# Patient Record
Sex: FEMALE | Race: WHITE | URBAN - METROPOLITAN AREA
[De-identification: names, ages, dates, MRNs, and addresses within clinical notes are randomized per-mention and may not be internally consistent; named-entity substitution may affect disease eponyms.]

---

## 2017-02-21 ENCOUNTER — HISTORICAL (OUTPATIENT)
Dept: LAB | Facility: HOSPITAL | Age: 32
End: 2017-02-21

## 2018-02-23 ENCOUNTER — HISTORICAL (OUTPATIENT)
Dept: ADMINISTRATIVE | Facility: HOSPITAL | Age: 33
End: 2018-02-23

## 2018-02-25 LAB
FINAL CULTURE: NO GROWTH
FINAL CULTURE: NO GROWTH

## 2021-04-22 LAB
ABS NEUT (OLG): 24.19 X10(3)/MCL (ref 2.1–9.2)
ALBUMIN SERPL-MCNC: 2.9 GM/DL (ref 3.5–5)
ALBUMIN/GLOB SERPL: 0.6 RATIO (ref 1.1–2)
ALP SERPL-CCNC: 363 UNIT/L (ref 40–150)
ALT SERPL-CCNC: 250 UNIT/L (ref 0–55)
AMPHET UR QL SCN: NEGATIVE
ANISOCYTOSIS BLD QL SMEAR: 2
APPEARANCE, UA: CLEAR
APTT PPP: 22.5 SECOND(S) (ref 23.2–33.7)
AST SERPL-CCNC: 290 UNIT/L (ref 5–34)
B-HCG SERPL QL: NEGATIVE
BACTERIA SPEC CULT: ABNORMAL /HPF
BARBITURATE SCN PRESENT UR: NEGATIVE
BENZODIAZ UR QL SCN: NEGATIVE
BILIRUB SERPL-MCNC: 3.7 MG/DL
BILIRUB UR QL STRIP: NEGATIVE
BILIRUBIN DIRECT+TOT PNL SERPL-MCNC: 1.5 MG/DL (ref 0–0.8)
BILIRUBIN DIRECT+TOT PNL SERPL-MCNC: 2.2 MG/DL (ref 0–0.5)
BNP BLD-MCNC: 50.2 PG/ML
BUN SERPL-MCNC: 19.8 MG/DL (ref 7–18.7)
CALCIUM SERPL-MCNC: 8.3 MG/DL (ref 8.4–10.2)
CANNABINOIDS UR QL SCN: POSITIVE
CHLORIDE SERPL-SCNC: 96 MMOL/L (ref 98–107)
CK MB SERPL-MCNC: 0.6 NG/ML
CK SERPL-CCNC: 37 U/L (ref 29–168)
CO2 SERPL-SCNC: 22 MMOL/L (ref 22–29)
COCAINE UR QL SCN: NEGATIVE
COLOR UR: YELLOW
CREAT SERPL-MCNC: 0.78 MG/DL (ref 0.55–1.02)
ERYTHROCYTE [DISTWIDTH] IN BLOOD BY AUTOMATED COUNT: 12.6 % (ref 11.5–17)
ETHANOL SERPL-MCNC: <10 MG/DL
GLOBULIN SER-MCNC: 4.5 GM/DL (ref 2.4–3.5)
GLUCOSE (UA): NEGATIVE
GLUCOSE SERPL-MCNC: 182 MG/DL (ref 74–100)
HCT VFR BLD AUTO: 29.4 % (ref 37–47)
HCT VFR BLD AUTO: 37.1 % (ref 37–47)
HGB BLD-MCNC: 10.2 GM/DL (ref 12–16)
HGB BLD-MCNC: 13 GM/DL (ref 12–16)
HGB UR QL STRIP: ABNORMAL
INR PPP: 1.1 (ref 0–1.3)
KETONES UR QL STRIP: ABNORMAL
LACTATE SERPL-SCNC: 1.2 MMOL/L (ref 0.5–2.2)
LEUKOCYTE ESTERASE UR QL STRIP: ABNORMAL
LIPASE SERPL-CCNC: 67 U/L
LYMPHOCYTES NFR BLD MANUAL: 6 % (ref 13–40)
MACROCYTES BLD QL SMEAR: 2 /MCL
MCH RBC QN AUTO: 36.1 PG (ref 27–31)
MCHC RBC AUTO-ENTMCNC: 35 GM/DL (ref 33–36)
MCV RBC AUTO: 103.1 FL (ref 80–94)
METAMYELOCYTES NFR BLD MANUAL: 1 %
MONOCYTES NFR BLD MANUAL: 1 % (ref 2–11)
MYELOCYTES NFR BLD MANUAL: 2 %
NEUTROPHILS NFR BLD MANUAL: 90 % (ref 47–80)
NITRITE UR QL STRIP: NEGATIVE
OPIATES UR QL SCN: NEGATIVE
PCP UR QL: NEGATIVE
PH UR STRIP.AUTO: 6.5 [PH] (ref 5–7.5)
PH UR STRIP: 6.5 [PH] (ref 5–9)
PLATELET # BLD AUTO: 296 X10(3)/MCL (ref 130–400)
PLATELET # BLD EST: NORMAL 10*3/UL
PMV BLD AUTO: 11.8 FL (ref 9.4–12.4)
POIKILOCYTOSIS BLD QL SMEAR: 1
POTASSIUM SERPL-SCNC: 4 MMOL/L (ref 3.5–5.1)
PROT SERPL-MCNC: 7.4 GM/DL (ref 6.4–8.3)
PROT UR QL STRIP: ABNORMAL
PROTHROMBIN TIME: 13.5 SECOND(S) (ref 11.1–13.7)
RBC # BLD AUTO: 3.6 X10(6)/MCL (ref 4.2–5.4)
RBC #/AREA URNS HPF: ABNORMAL /HPF
SARS-COV-2 RNA RESP QL NAA+PROBE: NOT DETECTED
SODIUM SERPL-SCNC: 134 MMOL/L (ref 136–145)
SP GR FLD REFRACTOMETRY: 1.01 (ref 1–1.03)
SP GR UR STRIP: 1.01 (ref 1–1.03)
SQUAMOUS EPITHELIAL, UA: ABNORMAL /HPF
TROPONIN I SERPL-MCNC: 0.05 NG/ML (ref 0–0.04)
TROPONIN I SERPL-MCNC: 0.08 NG/ML (ref 0–0.04)
TROPONIN I SERPL-MCNC: 0.09 NG/ML (ref 0–0.04)
UROBILINOGEN UR STRIP-ACNC: 1
WBC # SPEC AUTO: 29.4 X10(3)/MCL (ref 4.5–11.5)
WBC #/AREA URNS HPF: 30 /HPF (ref 0–3)

## 2021-04-23 ENCOUNTER — HISTORICAL (OUTPATIENT)
Dept: ADMINISTRATIVE | Facility: HOSPITAL | Age: 36
End: 2021-04-23

## 2021-04-23 LAB
ABS NEUT (OLG): 14.19 X10(3)/MCL (ref 2.1–9.2)
ALBUMIN SERPL-MCNC: 2 GM/DL (ref 3.5–5)
ALBUMIN/GLOB SERPL: 0.6 RATIO (ref 1.1–2)
ALP SERPL-CCNC: 205 UNIT/L (ref 40–150)
ALT SERPL-CCNC: 116 UNIT/L (ref 0–55)
ANISOCYTOSIS BLD QL SMEAR: 2
ANTINUCLEAR ANTIBODY SCREEN (OHS): NEGATIVE
AST SERPL-CCNC: 96 UNIT/L (ref 5–34)
BILIRUB SERPL-MCNC: 2.4 MG/DL
BILIRUBIN DIRECT+TOT PNL SERPL-MCNC: 0.9 MG/DL (ref 0–0.8)
BILIRUBIN DIRECT+TOT PNL SERPL-MCNC: 1.5 MG/DL (ref 0–0.5)
BUN SERPL-MCNC: 14.2 MG/DL (ref 7–18.7)
CALCIUM SERPL-MCNC: 7.5 MG/DL (ref 8.4–10.2)
CHLORIDE SERPL-SCNC: 106 MMOL/L (ref 98–107)
CK MB SERPL-MCNC: 0.4 NG/ML
CK MB SERPL-MCNC: <0.3 NG/ML
CK SERPL-CCNC: 16 U/L (ref 29–168)
CK SERPL-CCNC: 17 U/L (ref 29–168)
CO2 SERPL-SCNC: 23 MMOL/L (ref 22–29)
CREAT SERPL-MCNC: 0.77 MG/DL (ref 0.55–1.02)
DSDNA ANTIBODY (OHS): NEGATIVE
ERYTHROCYTE [DISTWIDTH] IN BLOOD BY AUTOMATED COUNT: 12.6 % (ref 11.5–17)
FERRITIN SERPL-MCNC: 2665.5 NG/ML (ref 4.63–204)
FOLATE SERPL-MCNC: 9.7 NG/ML (ref 7–31.4)
GLOBULIN SER-MCNC: 3.3 GM/DL (ref 2.4–3.5)
GLUCOSE SERPL-MCNC: 113 MG/DL (ref 74–100)
HCT VFR BLD AUTO: 27 % (ref 37–47)
HGB BLD-MCNC: 9.1 GM/DL (ref 12–16)
IRON SATN MFR SERPL: 20 % (ref 20–50)
IRON SERPL-MCNC: 35 UG/DL (ref 50–170)
LYMPHOCYTES NFR BLD MANUAL: 7 % (ref 13–40)
MACROCYTES BLD QL SMEAR: 2 /MCL
MCH RBC QN AUTO: 35.4 PG (ref 27–31)
MCHC RBC AUTO-ENTMCNC: 33.7 GM/DL (ref 33–36)
MCV RBC AUTO: 105.1 FL (ref 80–94)
MONOCYTES NFR BLD MANUAL: 4 % (ref 2–11)
NEUTROPHILS NFR BLD MANUAL: 89 % (ref 47–80)
PLATELET # BLD AUTO: 263 X10(3)/MCL (ref 130–400)
PLATELET # BLD EST: NORMAL 10*3/UL
PMV BLD AUTO: 10.8 FL (ref 7.4–10.4)
POTASSIUM SERPL-SCNC: 3.2 MMOL/L (ref 3.5–5.1)
PROT SERPL-MCNC: 5.3 GM/DL (ref 6.4–8.3)
RBC # BLD AUTO: 2.57 X10(6)/MCL (ref 4.2–5.4)
SODIUM SERPL-SCNC: 138 MMOL/L (ref 136–145)
TIBC SERPL-MCNC: 137 UG/DL (ref 70–310)
TIBC SERPL-MCNC: 172 UG/DL (ref 250–450)
TRANSFERRIN SERPL-MCNC: 144 MG/DL (ref 180–382)
TROPONIN I SERPL-MCNC: 0.07 NG/ML (ref 0–0.04)
TROPONIN I SERPL-MCNC: 0.07 NG/ML (ref 0–0.04)
VIT B12 SERPL-MCNC: >2000 PG/ML (ref 213–816)
WBC # SPEC AUTO: 18.7 X10(3)/MCL (ref 4.5–11.5)

## 2021-04-24 LAB
ALBUMIN SERPL-MCNC: 2.2 GM/DL (ref 3.5–5)
ALBUMIN SERPL-MCNC: 2.4 GM/DL (ref 3.5–5)
ALBUMIN/GLOB SERPL: 0.6 RATIO (ref 1.1–2)
ALBUMIN/GLOB SERPL: 0.8 RATIO (ref 1.1–2)
ALP SERPL-CCNC: 195 UNIT/L (ref 40–150)
ALP SERPL-CCNC: 219 UNIT/L (ref 40–150)
ALT SERPL-CCNC: 104 UNIT/L (ref 0–55)
ALT SERPL-CCNC: 122 UNIT/L (ref 0–55)
AST SERPL-CCNC: 127 UNIT/L (ref 5–34)
AST SERPL-CCNC: 75 UNIT/L (ref 5–34)
BILIRUB SERPL-MCNC: 2.4 MG/DL
BILIRUB SERPL-MCNC: 2.6 MG/DL
BILIRUBIN DIRECT+TOT PNL SERPL-MCNC: 0.5 MG/DL (ref 0–0.8)
BILIRUBIN DIRECT+TOT PNL SERPL-MCNC: 0.9 MG/DL (ref 0–0.8)
BILIRUBIN DIRECT+TOT PNL SERPL-MCNC: 1.7 MG/DL (ref 0–0.5)
BILIRUBIN DIRECT+TOT PNL SERPL-MCNC: 1.9 MG/DL (ref 0–0.5)
BUN SERPL-MCNC: 10.2 MG/DL (ref 7–18.7)
BUN SERPL-MCNC: 9.8 MG/DL (ref 7–18.7)
CALCIUM SERPL-MCNC: 7.6 MG/DL (ref 8.4–10.2)
CALCIUM SERPL-MCNC: 8 MG/DL (ref 8.4–10.2)
CHLORIDE SERPL-SCNC: 106 MMOL/L (ref 98–107)
CHLORIDE SERPL-SCNC: 107 MMOL/L (ref 98–107)
CO2 SERPL-SCNC: 22 MMOL/L (ref 22–29)
CO2 SERPL-SCNC: 25 MMOL/L (ref 22–29)
CREAT SERPL-MCNC: 0.7 MG/DL (ref 0.55–1.02)
CREAT SERPL-MCNC: 0.77 MG/DL (ref 0.55–1.02)
ERYTHROCYTE [DISTWIDTH] IN BLOOD BY AUTOMATED COUNT: 12.4 % (ref 11.5–17)
GLOBULIN SER-MCNC: 3 GM/DL (ref 2.4–3.5)
GLOBULIN SER-MCNC: 3.6 GM/DL (ref 2.4–3.5)
GLUCOSE SERPL-MCNC: 90 MG/DL (ref 74–100)
GLUCOSE SERPL-MCNC: 98 MG/DL (ref 74–100)
HAV IGM SERPL QL IA: NONREACTIVE
HBV CORE IGM SERPL QL IA: NONREACTIVE
HBV SURFACE AG SERPL QL IA: NONREACTIVE
HCT VFR BLD AUTO: 27.9 % (ref 37–47)
HCV AB SERPL QL IA: NONREACTIVE
HEPATITIS PANEL INTERP: NORMAL
HGB BLD-MCNC: 9.1 GM/DL (ref 12–16)
HIV 1+2 AB+HIV1 P24 AG SERPL QL IA: NONREACTIVE
MAGNESIUM SERPL-MCNC: 1.5 MG/DL (ref 1.6–2.6)
MCH RBC QN AUTO: 35.5 PG (ref 27–31)
MCHC RBC AUTO-ENTMCNC: 32.6 GM/DL (ref 33–36)
MCV RBC AUTO: 109 FL (ref 80–94)
PHOSPHATE SERPL-MCNC: 3.2 MG/DL (ref 2.3–4.7)
PLATELET # BLD AUTO: 316 X10(3)/MCL (ref 130–400)
PMV BLD AUTO: 11 FL (ref 9.4–12.4)
POTASSIUM SERPL-SCNC: 3.1 MMOL/L (ref 3.5–5.1)
POTASSIUM SERPL-SCNC: 4 MMOL/L (ref 3.5–5.1)
PROT SERPL-MCNC: 5.4 GM/DL (ref 6.4–8.3)
PROT SERPL-MCNC: 5.8 GM/DL (ref 6.4–8.3)
RBC # BLD AUTO: 2.56 X10(6)/MCL (ref 4.2–5.4)
SODIUM SERPL-SCNC: 138 MMOL/L (ref 136–145)
SODIUM SERPL-SCNC: 142 MMOL/L (ref 136–145)
TROP %DIFF IP 3 (OHS): ABNORMAL %
TROP 3HR (OHS): 0.08 NG/ML (ref 0–0.04)
TROP IP BASE (OHS): ABNORMAL NG/ML (ref 0–0.04)
WBC # SPEC AUTO: 17.3 X10(3)/MCL (ref 4.5–11.5)

## 2021-04-27 LAB
FINAL CULTURE: NORMAL
FINAL CULTURE: NORMAL

## 2021-05-03 ENCOUNTER — HISTORICAL (OUTPATIENT)
Dept: ADMINISTRATIVE | Facility: HOSPITAL | Age: 36
End: 2021-05-03

## 2021-05-03 LAB
ABS NEUT (OLG): 5.4 X10(3)/MCL (ref 2.1–9.2)
ALBUMIN SERPL-MCNC: 3.8 GM/DL (ref 3.4–5)
ALBUMIN/GLOB SERPL: 1.46 {RATIO} (ref 1.5–2.5)
ALP SERPL-CCNC: 67 UNIT/L (ref 38–126)
ALT SERPL-CCNC: 32 UNIT/L (ref 7–52)
APPEARANCE, UA: NORMAL
AST SERPL-CCNC: 36 UNIT/L (ref 15–37)
B-HCG SERPL QL: NEGATIVE
BACTERIA #/AREA URNS AUTO: NORMAL /HPF
BILIRUB SERPL-MCNC: 1.1 MG/DL (ref 0.2–1)
BILIRUB UR QL STRIP: NEGATIVE MG/DL
BILIRUBIN DIRECT+TOT PNL SERPL-MCNC: 0.4 MG/DL (ref 0–0.5)
BILIRUBIN DIRECT+TOT PNL SERPL-MCNC: 0.7 MG/DL
BUN SERPL-MCNC: 11 MG/DL (ref 7–18)
CALCIUM SERPL-MCNC: 8.5 MG/DL (ref 8.5–10)
CHLORIDE SERPL-SCNC: 103 MMOL/L (ref 98–107)
CHOLEST SERPL-MCNC: 216 MG/DL (ref 0–200)
CHOLEST/HDLC SERPL: 5.5 {RATIO}
CO2 SERPL-SCNC: 26 MMOL/L (ref 21–32)
COLOR UR: YELLOW
CREAT SERPL-MCNC: 0.76 MG/DL (ref 0.6–1.3)
ERYTHROCYTE [DISTWIDTH] IN BLOOD BY AUTOMATED COUNT: 11.7 % (ref 11.5–17)
EST CREAT CLEARANCE SER (OHS): 114.17 ML/MIN
GLOBULIN SER-MCNC: 2.6 GM/DL (ref 1.2–3)
GLUCOSE (UA): NEGATIVE MG/DL
GLUCOSE SERPL-MCNC: 97 MG/DL (ref 74–106)
HCT VFR BLD AUTO: 29.9 % (ref 37–47)
HDLC SERPL-MCNC: 39 MG/DL (ref 35–60)
HGB BLD-MCNC: 9.8 GM/DL (ref 12–16)
HGB UR QL STRIP: NEGATIVE UNIT/L
KETONES UR QL STRIP: NEGATIVE MG/DL
LDLC SERPL CALC-MCNC: 170 MG/DL (ref 0–129)
LEUKOCYTE ESTERASE UR QL STRIP: NORMAL UNIT/L
LYMPHOCYTES # BLD AUTO: 2.5 X10(3)/MCL (ref 0.6–3.4)
LYMPHOCYTES NFR BLD AUTO: 29.4 % (ref 13–40)
MCH RBC QN AUTO: 35.6 PG (ref 27–31.2)
MCHC RBC AUTO-ENTMCNC: 33 GM/DL (ref 32–36)
MCV RBC AUTO: 109 FL (ref 80–94)
MONOCYTES # BLD AUTO: 0.5 X10(3)/MCL (ref 0.1–1.3)
MONOCYTES NFR BLD AUTO: 5.8 % (ref 0.1–24)
NEUTROPHILS NFR BLD AUTO: 64.8 % (ref 47–80)
NITRITE UR QL STRIP.AUTO: NEGATIVE
PH UR STRIP: 6 [PH]
PLATELET # BLD AUTO: 610 X10(3)/MCL (ref 130–400)
PMV BLD AUTO: 8 FL (ref 9.4–12.4)
POTASSIUM SERPL-SCNC: 4.8 MMOL/L (ref 3.5–5.1)
PROT SERPL-MCNC: 6.4 GM/DL (ref 6.4–8.2)
PROT UR QL STRIP: NEGATIVE MG/DL
RBC # BLD AUTO: 2.75 X10(6)/MCL (ref 4.2–5.4)
RBC #/AREA URNS HPF: NORMAL /HPF
SODIUM SERPL-SCNC: 137 MMOL/L (ref 136–145)
SP GR UR STRIP: <1.005
SQUAMOUS EPITHELIAL, UA: NORMAL /LPF
TRIGL SERPL-MCNC: 108 MG/DL (ref 30–150)
TSH SERPL-ACNC: 3.45 MIU/ML (ref 0.35–4.94)
UROBILINOGEN UR STRIP-ACNC: 0.2 MG/DL
VLDLC SERPL CALC-MCNC: 21.6 MG/DL
WBC # SPEC AUTO: 8.4 X10(3)/MCL (ref 4.5–11.5)
WBC #/AREA URNS AUTO: NORMAL /[HPF]

## 2021-05-13 ENCOUNTER — HISTORICAL (OUTPATIENT)
Dept: ADMINISTRATIVE | Facility: HOSPITAL | Age: 36
End: 2021-05-13

## 2021-05-13 LAB — BNP BLD-MCNC: 102.4 PG/ML

## 2022-04-07 ENCOUNTER — HISTORICAL (OUTPATIENT)
Dept: ADMINISTRATIVE | Facility: HOSPITAL | Age: 37
End: 2022-04-07

## 2022-04-23 VITALS
DIASTOLIC BLOOD PRESSURE: 70 MMHG | HEIGHT: 66 IN | BODY MASS INDEX: 28.03 KG/M2 | SYSTOLIC BLOOD PRESSURE: 120 MMHG | WEIGHT: 174.38 LBS

## 2022-04-30 NOTE — OP NOTE
DATE OF SURGERY:    04/23/2021    SURGEON:  Huey Henry MD    PROCEDURE:  Esophagogastroduodenoscopy and biopsy.    PREOPERATIVE DIAGNOSIS:  Hematemesis.    POSTOPERATIVE DIAGNOSES:    1. Adequate sedation with Diprivan per Anesthesia.  2. Relatively normal hypopharynx and larynx.  3. Normal esophagoscopy.  4. Normal cardia and fundus.  5. Very sizable, benign-appearing ulcer in the distal body along the greater curvature, with some dark eschar, but without a visible vessel or adjacent blood.  Adjacent biopsies obtained to rule out Helicobacter.  No biopsies of the ulcer itself undertaken.  6. Patent pylorus, with normal duodenum to the second portion.    PROCEDURE IN DETAIL:  The patient consented for the procedure after a detailed explanation of the risks and complications, including bleeding and perforation, as well as adverse reaction to sedation.     The patient was placed in the left lateral decubitus position.  A video endoscope was inserted in the oropharynx and passed under direct vision.  The hypopharynx and larynx were briefly examined and the esophagus entered.  After evaluation of the upper, mid and lower esophagus, the scope was passed into the stomach and retroflexed.  The cardia and fundus were evaluated.  The scope was de-retroflexed and we evaluated the body and antrum.     We traversed the pylorus, evaluating the first and second portions of the duodenum.  The scope was withdrawn and biopsies and specimens obtained as outlined below.  The scope was removed in its entirety and the patient tolerated the procedure well.     The patient's findings are as detailed.  She had quite a sizable, benign-appearing ulcer in the distal body along the greater curvature.  There was a dark eschar, but no visible vessel and no adjacent blood.  Suspecting it to be benign, I did not biopsy it, but I did obtain adjacent biopsies to rule out Helicobacter.  I have to presume this relates to her  nonsteroidal use.    DISCUSSION:  After our procedure was completed, we discussed our findings with the patient.  She needs to be on b.i.d. proton pump inhibition for the time being and then daily PPI therapy for at least an additional 12 weeks.  She needs to avoid aspirin and nonsteroidals in any way, shape, or form, and, of course, she needs to abstain from alcohol.     I would envision she ought to have a repeat endoscopy in 8 weeks to 12 weeks to assure ulcer healing.  I believe this to be benign, but given its size, I am a bit leery about not following up.        ______________________________  MD LISE Crews/VLADISLAV  DD:  04/23/2021  Time:  01:46PM  DT:  04/23/2021  Time:  02:48PM  Job #:  [id number]    cc: __________

## 2022-04-30 NOTE — ED PROVIDER NOTES
Patient:   Judy CALDERON            MRN: [id number]            FIN: [id number]               Age:   35 years     Sex:  Female     :  1985   Associated Diagnoses:   Alcohol withdrawal; GI bleeding   Author:   Chayito JAIN, Wei BHARDWAJ      Basic Information   Time seen: Date & time 2021 09:10:00.   History source: Patient.   Arrival mode: Private vehicle.   History limitation: None.   Additional information: Chief Complaint from Nursing Triage Note : Chief Complaint   2021 9:08 CDT       Chief Complaint           pt c/o bilateral pedal and ankle edmea, hypertension, and reports vomiting blood this morning, reports being an alcoholic  .      History of Present Illness   The patient presents with   35 year old female with hx of alcoholism presents to ED for elevated blood pressure for the past few days with bilateral lower extremity swelling; Reports hematemesis this AM; Reports previous history of HTN, but reports weight loss that normalized pressure; denies headache, dizziness; Last drink last night - M. GABY Lea  and     I, Dr. Stratton, assumed care of this patient upon walking into the room at 0956.     34 y/o  female with a history of HTN presents to the ED with complaints of nausea, generalized weakness, and hives following a recent post nasal drip. The pt states she had a visit at the clinic Tuesday where she was prescribed Zofran, medicine for coughing, and steroid cream. After her visit at the clinic the patient gagged herself this morning, experiencing hematemesis with abdominal pain. The pt says she is having darker than normal stools, and has been on steroids before with no episodes similar to this. The pt states last menstrual cycle is unknown. The pt states she has at least half a fifth of liquor everyday. .  The onset was 3  days ago.  The course/duration of symptoms is constant.  Risk factors consist of hypertension and ETOH usage.  Prior episodes: none.   Therapy today: none.  Associated symptoms: abdominal pain, nausea and vomiting.        Review of Systems   Constitutional symptoms:  WeaknessNo fatigue   Skin symptoms:  Negative except as documented in HPI, Hives   Eye symptoms:  Negative except as documented in \[facility] [place] [facility]\""   ENMT symptoms:  Negative except as documented in HPI   Respiratory symptoms:  Negative except as documented in HPI   Cardiovascular symptoms:  Negative except as documented in HPI   Gastrointestinal symptoms:  Change in stool color (Darker than normal stools), Acid reflux   Musculoskeletal symptoms:  Negative except as documented in HPI.   Neurologic symptoms:  Negative except as documented in HPI   Hematologic/Lymphatic symptoms:  Negative except as documented in \[facility] [place] [facility]\""             Additional review of systems information: All other systems reviewed and otherwise negative.      Health Status   Allergies:    Allergic Reactions (Selected)  Severity Not Documented  Lisinopril- Cough..   Medications:  (Selected)   Prescriptions  Prescribed  Flonase 50 mcg/inh nasal spray: 2 spray(s), Nasal, Daily, # 16 gm, 11 Refill(s), Pharmacy: [facility] [id number]  NIFEdipine 30 mg oral tablet, extended release: See Instructions, TAKE 1 TABLET BY MOUTH TWICE DAILY, # 60 tab(s), 11 Refill(s), Pharmacy: [facility]  busPIRone 10 mg oral tablet: 10 mg = 1 tab(s), Oral, TID, # 90 tab(s), 11 Refill(s), Pharmacy: [facility] [id number]  labetalol 200 mg oral tablet: See Instructions, TAKE 1 TABLET BY MOUTH TWICE DAILY, # 60 tab(s), 11 Refill(s), Pharmacy: [facility]  sertraline 50 mg oral tablet: See Instructions, TAKE 1 TABLET BY MOUTH DAILY, # 30 tab(s), 11 Refill(s), eRx: [facility]  Documented Medications  Documented  Misc Prescription: LIVER AND KIDNEY SUPPORT 3 TABS DAILY, 0 Refill(s)  Misc Prescription: POTASSIUM DAILY, 0 Refill(s)  Prenatal Multivitamins: Oral, Daily, 0 Refill(s)  biotin: Oral, Daily, 0  Refill(s)  clonazePAM 0.5 mg oral tablet: 1 mg, 1-2 tab(s), Oral, qPM.      Past Medical/ Family/ Social History   Medical history:    Active  Hypertension ([id number]): Onset in  at 26 years.  Resolved  Pre-eclampsia (2511093343):  Resolved.  Comments:  2019 CST 7:36 CST - Agustín CLEVELAND, Teodora LATOYA  WITH FIRST PREGNANCY.  PLACENTA ABRUPTION AT 34 WEEKS GESTATION..   Surgical history:    RIGHT ACL & MENINSCAL REPAIR in 2014 at 29 Years.  Extraction of wisdom tooth ([id number]) in 2014 at 29 Years.   X1.   of stillborn b/c placenta abruptia..   Family history:    Total hip replacement  Mother  Anxiety  Mother  Diabetes mellitus type 2  Mother  Brother  Osteoporosis  Mother  Detached retina  Mother  Hypercholesterolemia  Father  Brother  CABG x 5 - Coronary artery bypass grafts x 5  Father  Hypertension.  Father  Brother  Depression.  Mother  Sister  .   Social history: Alcohol use: Regularly, Tobacco use: Denies, Occupation: Employed, Family/social situation: .   Problem list:    Active Problems (4)  Anxiety   Hypertension   Immunization due   Wellness examination   .      Physical Examination               Vital Signs   Vital Signs   2021 9:08 CDT       Temperature Oral          36.6 DegC                             Temperature Oral (calculated)             97.88 DegF                             Peripheral Pulse Rate     127 bpm  HI                             Respiratory Rate          22 br/min                             SpO2                      100 %                             Oxygen Therapy            Room air                             Systolic Blood Pressure   187 mmHg  HI                             Diastolic Blood Pressure  129 mmHg  HI  .   Measurements   2021 9:08 CDT       Weight Dosing             68 kg                             Weight Measured and Calculated in Lbs     149.91 lb                             Weight Estimated          68 kg                              Height/Length Dosing      167 cm                             Height/Length Estimated   167 cm                             Body Mass Index Estimated 24.38 kg/m2  .   Basic Oxygen Information   4/22/2021 10:17 CDT      SpO2                      100 %    4/22/2021 9:08 CDT       SpO2                      100 %                             Oxygen Therapy            Room air  .   General:  Alert, mild distress, Tremulous   Neurological:  Alert and oriented to person, place, time, and situation, No focal neurological deficit observed, CN II-XII intact, normal sensory observed, normal motor observed, normal coordination observed, Speech: Normal.    Skin:  Warm, dry   Head:  Normocephalic, atraumatic   Neck:  Supple, trachea midline, no JVD, no carotid bruit   Eye:  Pupils are equal, round and reactive to light, extraocular movements are intact, normal conjunctiva, vision unchanged   Ears, nose, mouth and throat:  Tympanic membranes clear, oral mucosa moist, no pharyngeal erythema or exudate   Cardiovascular:  Regular rate and rhythm, No murmur, Normal peripheral perfusion, Tachycardia   Respiratory:  Lungs are clear to auscultation, breath sounds are equal   Back:  Nontender, Normal range of motion   Musculoskeletal:  Normal ROM, normal strength, no tenderness, no swelling   Gastrointestinal:  Soft, Nontender, Non distended, Normal bowel sounds   Lymphatics:  No lymphadenopathy.   Psychiatric:  Cooperative, Mood and affect: Anxious.       Medical Decision Making   Documents reviewed:  Emergency department nurses' notes.   Orders  Launch Orders   Laboratory:  POC Urine Pregnancy Test ED (Order): Urine, Stat collect, Collected, 4/22/2021 9:14 CDT by Ricardo Lea PA-C Nurse collect, Print Label By Order Location  Urinalysis with Reflex (Order): Stat collect, Urine, 4/22/2021 9:14 CDT, Nurse collect, Print Label By Order Location  Lipase Level (Order): Stat collect, 4/22/2021 9:14 CDT, Blood, Lab Collect, Print Label By  Order Location, 4/22/2021 9:14 CDT  BNP (Order): Stat collect, 4/22/2021 9:14 CDT, Blood, Lab Collect, Print Label By Order Location, 4/22/2021 9:14 CDT  POC iSTAT ER Troponin request (Order): Blood, Stat collect, 4/22/2021 9:14 CDT by Venu GRIFFIN, Ricardo VELÁSQUEZ Lab Collect, Print Label By Order Location  Troponin-I (Order): Stat collect, 4/22/2021 9:14 CDT, Blood, Lab Collect, Print Label By Order Location, 4/22/2021 9:14 CDT  CBC w/ Auto Diff (Order): Stat collect, 4/22/2021 9:14 CDT, Blood, Lab Collect, Print Label By Order Location, 4/22/2021 9:14 CDT  CMP (Order): Stat collect, 4/22/2021 9:14 CDT, Blood, Lab Collect, Print Label By Order Location, 4/22/2021 9:14 CDT  Radiology:  XR Chest 1 View (Order): Stat, 4/22/2021 9:14 CDT, Cough, None, Stretcher, Rad Type, Not Scheduled  Cardiology:  EKG (Order): 4/22/2021 9:14 CDT, Stat, Once, 4/22/2021 9:14 CDT, -1, -1, 4/22/2021 9:14 CDT, Launch Orders   Laboratory:  PTT (Order): Stat collect, 4/22/2021 9:15 CDT, Blood, Lab Collect, Print Label By Order Location, 4/22/2021 9:15 CDT  PT (Protime) (Order): Stat collect, 4/22/2021 9:15 CDT, Blood, Lab Collect, Print Label By Order Location, 4/22/2021 9:15 CDT, Launch Orders   Pharmacy:  hydrALAZINE (Apresoline) Inj. (Order): 10 mg, form: Injection, IV Push, Once, first dose 4/22/2021 9:15 CDT, stop date 4/22/2021 9:15 CDT, STAT  Zofran 2 mg/mL injectable solution (Order): 4 mg, form: Injection, IV Push, Once, first dose 4/22/2021 9:15 CDT, stop date 4/22/2021 9:15 CDT, STAT.    Electrocardiogram:  Time 4/22/2021 09:59:00, rate 126, The Rhythm is sinus tachycardia.  .    Results review:  Lab results : Lab View   4/22/2021 13:00 CDT      Troponin-I                0.078 ng/mL  HI                             Hgb                       10.2 gm/dL  LOW                             Hct                       29.4 %  LOW    4/22/2021 11:36 CDT      SARS-CoV-2 PCR            Not Detected    4/22/2021 10:45 CDT      U pH                       6.5                             U Spec Grav               1.011                             UA Appear                 CLEAR                             UA Color                  YELLOW                             UA Spec Grav              1.011                             UA Bili                   Negative                             UA pH                     6.5                             UA Urobilinogen           1.0                             UA Blood                  Trace                             UA Glucose                Negative                             UA Ketones                Trace                             UA Protein                Trace                             UA Nitrite                Negative                             UA Leuk Est               2+                             UA WBC                    30 /HPF  HI                             UA RBC                    1-2 /HPF                             UA Bacteria               2+ /HPF                             UA Squam Epithelial       0-4 /HPF                             U Amph Scr                Negative                             U Christie Scr                Negative                             U Benzodia Scr            Negative                             U Cannab Scr              Positive                             U Cocaine Scr             Negative                             U Opiate Scr              Negative                             U Phencyclidine Scr       Negative    4/22/2021 10:32 CDT      Lactic Acid Lvl           1.2 mmol/L                             Ethanol Lvl               <10.0 mg/dL  NA    4/22/2021 10:00 CDT      Est Creat Clearance Ser   93.32 mL/min    4/22/2021 9:21 CDT       Sodium Lvl                134 mmol/L  LOW                             Potassium Lvl             4.0 mmol/L                             Chloride                  96 mmol/L  LOW                             CO2                       22  mmol/L                             Calcium Lvl               8.3 mg/dL  LOW                             Glucose Lvl               182 mg/dL  HI                             BUN                       19.8 mg/dL  HI                             Creatinine                0.78 mg/dL                             eGFR-AA                   >60  NA                             eGFR-AMADOU                  >60 mL/min/1.73 m2  NA                             Bili Total                3.7 mg/dL  HI                             Bili Direct               2.2 mg/dL  HI                             Bili Indirect             1.50 mg/dL  HI                             AST                       290 unit/L  HI                             ALT                       250 unit/L  HI                             Alk Phos                  363 unit/L  HI                             Total Protein             7.4 gm/dL                             Albumin Lvl               2.9 gm/dL  LOW                             Globulin                  4.5 gm/dL  HI                             A/G Ratio                 0.6 ratio  LOW                             BNP                       50.2 pg/mL                             Lipase Lvl                67 U/L  HI                             Troponin-I                0.052 ng/mL  HI                             PT                        13.5 second(s)                             INR                       1.1                             PTT                       22.5 second(s)  LOW                             WBC                       29.4 x10(3)/mcL  HI                             RBC                       3.60 x10(6)/mcL  LOW                             Hgb                       13.0 gm/dL                             Hct                       37.1 %                             Platelet                  296 x10(3)/mcL                             MCV                       103.1 fL  HI                             MCH                        36.1 pg  HI                             MCHC                      35.0 gm/dL                             RDW                       12.6 %                             MPV                       11.8 fL                             Abs Neut                  24.19 x10(3)/mcL  HI                             Neut Man                  90 %  HI                             Lymph Man                 6 %  LOW                             Monocyte Man              1 %  LOW                             [place] Man                  1 %  HI                             Myelo Man                 2 %  HI                             Platelet Est              Normal                             Anisocyte                 2  HI                             Poik                      1  HI                             Macrocyte                 2 /mcL  HI                             Stomatocytes              1+  .   Radiology results:  Rad Results (ST)  < 12 hrs   Accession: [id number]  Order: XR Chest 1 View  Report Dt/Tm: 04/22/2021 10:33  Report:   EXAM: XR Chest 1 View  DATE: 4/22/2021 10:03 AM CDT  INDICATION: Cough     COMPARISON: None available.     TECHNIQUE: AP view of the chest.        FINDINGS:   The cardiomediastinal silhouette is normal in size and contour.  Pulmonary vascularity is within normal limits. The lungs are  well-inflated and are without focal consolidation, pleural effusion,  or pneumothorax.     The imaged osseous structures and soft tissues are without acute  abnormality.        IMPRESSION:  No acute cardiopulmonary process.      .      Reexamination/ Reevaluation   Patient with some relief after Ativan.  Given Protonix and fluids in the ED.  Patient with elevated bilirubin, liver enzymes consistent with her alcohol abuse.  Her hematocrit did drop 8 points, with fairly mild dehydration, so there is a concern for GI bleeding still.  Remains somewhat hypertensive and tachycardic, I believe this is secondary  to alcohol withdrawal but will have to be monitored as well with serial H&H's.  We'll admit      Procedure   Critical care note   Total time: 45 minutes spent engaged in work directly related to patient care and/ or available for direct patient care.   Critical condition(s) addressed for impending deterioration include: cardiovascular.   Associated risk factors: bleeding, hypertension.   Management: bedside assessment, supervision of care, Interpretation (electrocardiogram, blood pressure), Interventions hemodynamic management, Case review primary care physician.   Performed by: self.      Impression and Plan   Diagnosis   Alcohol withdrawal (FIQ01-DW F10.239)   GI bleeding (YIE81-GH K92.2)      Calls-Consults   -  Ronnell.   Plan   Condition: Stable.    Disposition: Admit time  4/22/2021 14:09:00, Admit to Inpatient Unit.    Counseled: Patient, Family, Regarding diagnosis, Regarding diagnostic results, Regarding treatment plan, Patient indicated understanding of instructions.    Notes: I, Gavin Alvarez, acted soley as a scribe for and in the prescence of  who performed the service. , I  personally performed all of the above services as recorded in this chart.

## 2022-04-30 NOTE — CONSULTS
Patient:   Judy CALDERON            MRN: [id number]            FIN: [id number]               Age:   35 years     Sex:  Female     :  1985   Associated Diagnoses:   None   Author:   Carito Jewell PA-C      Basic Information   Source of history:  Self, Spouse, Medical record.    Present at bedside:  Significant other.    History limitation:  None.       Chief Complaint   We have been consulted by Dr. Polk for hematemesis.      History of Present Illness     This is a 34 YO CF, unknown to our group, with a past medical history of etoh abuse, depression, HTN, and placental abruption s/p spontaneous  and DIC. Presented to the ED 21 with complaints of hematemesis, peripheral edema, and HTN. On arrival, hypertensive 187/129 and tachycardic 127 bbm. Labs revealed hyponatremia, hyperglycemia 182, elevated BUN 19.8, hyperbilirubinemia 3.7, direct predominant 2.2, , , , hypoalbuminemia 2.9, elevated troponin thought secondary to demand ischemia, INR 1.1, leukocytosis 29.4, macrocytosis with likely hemoconcentration hemoglobin 13 g/dL, ethanol undetected, toxicology positive for cannabis.  GI consulted for hematemesis.    Reports acute, moderate volume, bright red hematemesis yesterday morning that occurred after triggering her gag reflex by brushing her teeth. Admits blood filled the sink. Denies further episodes since. Reports unintentional weight loss of 45lbs over the past 3 years attributed to etoh abuse. Admits to anorexia. Admits to frequent nausea secondary to post nasal drip. Denies vomiting. Denies history of hematemesis. Admits to infrequent reflux, pyrosis, and dyspepsia controlled with OTC TUMs as needed. Denies abdominal pain. Change in bowel habits described as a more watery consistency again attributed to etoh abuse. Denies melena or hematochezia. Admits that peripheral edema started around Tuesday with generalized hives. Was treated at  with several  different medications. Denies jaundice, choluria, pruritus or scleral icterus. Denies history of anemia; however, admits that she had a placental abruption and subsequent miscarriage 3 years ago which led to DIC and several blood products transfusion. This is the cause for her drinking. Admits she rather drink than take the drugs her psychiatrist provides. Etoh insidious increase over the past 3 years to half a fifth daily. Denies use of tobacco. Takes Naprosyn for generalized aches/pains every other day. Smokes marijuana weekly. Mother with history of the need for esophageal dilations; otherwise, denies familial history of GI neoplasm. Endoscopy naive.     Previous records reviewed. Collateral information obtained from  at bedside.      Review of Systems   Constitutional:  No fever, No chills, No weakness, No fatigue.    Eye:  No icterus.    Ear/Nose/Mouth/Throat:  No sore throat.    Respiratory:  No shortness of breath, No cough.    Cardiovascular:  Peripheral edema, No chest pain, No palpitations.    Gastrointestinal:  Nausea, Hematemesis, No melena, No hematochezia, No vomiting, No diarrhea, No constipation, No heartburn, No abdominal pain.    Genitourinary:  No dysuria, No hematuria.    Musculoskeletal:  No joint pain, No muscle pain.    Integumentary:  No jaundice, No rash.    Neurologic:  No confusion, No headache.    Psychiatric:  Depression, Not suicidal, Not delusional, No hallucinations.       Health Status   Allergies:    Allergies (1) Active Reaction  lisinopril Cough     Current medications:  (Selected)   Inpatient Medications  Ordered  Ativan: 1 mg, form: Injection, IV Push, q4hr PRN for agitation, first dose 04/23/21 7:45:00 CDT, NOW  Phenergan: 12.5 mg, form: Injection, IM, q4hr PRN for nausea/vomiting, first dose 04/22/21 16:22:00 CDT  Protonix 40 mg Vial (IV Push): 40 mg, form: Injection, IV Slow, BID, Infuse over: 2 minute(s), first dose 04/22/21 21:00:00 CDT  Rocephin (for IVPB): 1,000  mg, form: Injection Complicated UTI, IV Piggyback, q24hr, Infuse over: 30 minute(s), first dose 21 19:00:00 CDT  Serax: 10 mg, form: Cap, Oral, TID, first dose 21 18:59:00 CDT  Sodium Chloride 0.9% 1000mL 1,000 mL: 1,000 mL, 1,000 mL, IV, Now, 500 mL/hr, start date 21 10:11:00 CDT, 1.76, m2  Zofran: 4 mg, form: Injection, IV Push, q4hr PRN for nausea, first dose 21 16:22:00 CDT  folic acid 1 mg oral tablet: 1 mg, form: Tab, Oral, Daily, first dose 21 9:00:00 CDT  hydrALAZINE (Apresoline) Inj.: 10 mg, form: Injection, IV Push, q2hr PRN for hypertension, first dose 21 17:21:00 CDT  thiamine: 100 mg, form: Tab, Oral, Daily, first dose 21 9:00:00 CDT  Documented Medications  Documented  Latuda 20 mg oral tablet: 20 mg = 1 tab(s), Oral, Daily  azithromycin 250 mg oral tablet: 250 mg = 1 tab(s), Oral, As Directed  benzonatate 200 mg oral capsule: 200 mg = 1 cap(s), Oral, TID, PRN PRN cough  methylPREDNISolone 4 mg oral tab: Oral  ondansetron 4 mg oral tablet, disintegratin mg = 1 tab(s), Oral, q8hr  triamcinolone 0.5% topical cream: TOP, BID, PRN PRN rash,    Medications (10) Active  Scheduled: (5)  cefTRIAXone  1,000 mg 1 EA, IV Piggyback, q24hr  folic acid 1 mg Tab UD  1 mg 1 tab(s), Oral, Daily  oxazepam 10 mg Cap  10 mg 1 cap(s), Oral, TID  pantoprazole 40 mg Inj  40 mg 1 EA, IV Slow, BID  thiamine 100 mg (Vit B1) Tab UD  100 mg 1 tab(s), Oral, Daily  Continuous: (1)  sodium chloride 0.9% 1,000 mL  1,000 mL, IV, 500 mL/hr  PRN: (4)  hydrALAzine 20 mg/ml Inj- 1 mL  10 mg 0.5 mL, IV Push, q2hr  LORazepam 2 mg/ml Inj  1 mg 0.5 mL, IV Push, q4hr  ondansetron 2 mg/mL inj - 2mL  4 mg 2 mL, IV Push, q4hr  promethazine 25 mg/mL Inj  12.5 mg 0.5 mL, IM, q4hr     Problem list:    All Problems  Anxiety / SNOMED CT 08987393 / Confirmed  Hypertension / SNOMED CT 60265493 / Confirmed  Immunization due / SNOMED CT 996776779 / Confirmed  Wellness examination / SNOMED CT 328509535 /  Confirmed  Resolved: Pre-eclampsia / SNOMED CT 1056468620  WITH FIRST PREGNANCY.  PLACENTA ABRUPTION AT 34 WEEKS GESTATION.      Histories   Past Medical History:    Active  Hypertension (66525068): Onset in  at 26 years.  Resolved  Pre-eclampsia (2376755840):  Resolved.  Comments:  2019 CST 7:36 CST - Teodora Randolph CMA  WITH FIRST PREGNANCY.  PLACENTA ABRUPTION AT 34 WEEKS GESTATION.   Family History:    Total hip replacement  Mother  Anxiety  Mother  Diabetes mellitus type 2  Mother  Brother  Osteoporosis  Mother  Detached retina  Mother  Hypercholesterolemia  Father  Brother  CABG x 5 - Coronary artery bypass grafts x 5  Father  Hypertension.  Father  Brother  Depression.  Mother  Sister     Procedure history:    RIGHT ACL & MENINSCAL REPAIR in 2014 at 29 Years.  Extraction of wisdom tooth ([id number]) in 2014 at 29 Years.   X1.   of stillborn b/c placenta abruptia.   Social History        Social & Psychosocial Habits    Alcohol  2019  Use: Never    2019  Use: Current    Type: Beer, Liquor    Frequency: Daily    Average drinks per episode in last year: 6    Comment: ETOH WEEKLY - 2019 10:07 - Teodora Randolph CMA    Employment/School  2019  Status: Employed    Description: [employer]    Home/Environment  2019  Lives with: Spouse    Comment: 1 SON  AT 34 WEEKS IN UTERO PLACENTA ABRUPTIA. - 2019 07:33 - Teodora Randolph CMA      Comment: 1 SON HEALTHY - 2019 07:33 - Teodora Randolph CMA    Substance Use  2021  Use: Current    Type: Marijuana    Frequency: 1-2 times per week    Tobacco  2019  Use: Never (less than 100 in l    Patient Wants Consult For Cessation Counseling N/A    2019  Use: Never (less than 100 in l    Patient Wants Consult For Cessation Counseling N/A    2021  Use: Never (less than 100 in l    Patient Wants Consult For Cessation Counseling N/A    Abuse/Neglect  2021  SHX Any signs of abuse  or neglect No  .        Physical Examination   General:  Alert and oriented, No acute distress.    Eye:  Normal conjunctiva.    HENT:  Normocephalic, Oral mucosa is moist.    Neck:  Supple.    Respiratory:  Respirations are non-labored, Breath sounds are equal, Symmetrical chest wall expansion.    Cardiovascular:  Normal rate, Regular rhythm.    Gastrointestinal:  Soft, Non-tender, Non-distended, Normal bowel sounds.       Vital Signs (last 24 hrs)_____  Last Charted___________  Temp Oral     36.6 DegC  (APR 23 08:11)  Heart Rate Peripheral   H 112bpm  (APR 23 08:11)  Resp Rate         16 br/min  (APR 23 04:43)  SBP      H 147mmHg  (APR 23 08:11)  DBP      H 99mmHg  (APR 23 08:11)  SpO2      100 %  (APR 23 08:11)  Weight      68 kg  (APR 22 16:44)  Height      167 cm  (APR 22 16:44)  BMI      24.38  (APR 22 16:44)     Musculoskeletal:  Minimal peripheral edema BLEs.    Integumentary:  Warm, Dry.    Neurologic:  Alert, Oriented.    Psychiatric:  Cooperative, Appropriate mood & affect.       Review / Management   Results review   Laboratory Results   Today's Lab Results : PowerNote Discrete Results   4/23/2021 6:09 CDT       Est Creat Clearance Ser   94.53 mL/min    4/23/2021 5:34 CDT       WBC                       18.7 x10(3)/mcL  HI                             RBC                       2.57 x10(6)/mcL  LOW                             Hgb                       9.1 gm/dL  LOW                             Hct                       27.0 %  LOW                             Platelet                  263 x10(3)/mcL                             MCV                       105.1 fL  HI                             MCH                       35.4 pg  HI                             MCHC                      33.7 gm/dL                             RDW                       12.6 %                             MPV                       10.8 fL                             Abs Neut                  14.19 x10(3)/mcL  HI                              Neut Man                  89 %  HI                             Lymph Man                 7 %  LOW                             Monocyte Man              4 %                             Platelet Est              Normal                             Anisocyte                 2  HI                             Macrocyte                 2 /mcL  HI                             Sodium Lvl                138 mmol/L                             Potassium Lvl             3.2 mmol/L  LOW                             Chloride                  106 mmol/L                             CO2                       23 mmol/L                             Calcium Lvl               7.5 mg/dL  LOW                             Glucose Lvl               113 mg/dL  HI                             BUN                       14.2 mg/dL                             Creatinine                0.77 mg/dL                             eGFR-AA                   >60  NA                             eGFR-AMADOU                  >60 mL/min/1.73 m2  NA                             Bili Total                2.4 mg/dL  HI                             Bili Direct               1.5 mg/dL  HI                             Bili Indirect             0.90 mg/dL  HI                             AST                       96 unit/L  HI                             ALT                       116 unit/L  HI                             Alk Phos                  205 unit/L  HI                             Total Protein             5.3 gm/dL  LOW                             Albumin Lvl               2.0 gm/dL  LOW                             Globulin                  3.3 gm/dL                             A/G Ratio                 0.6 ratio  LOW                             Folate Lvl                9.7 ng/mL                             Vitamin B12 Lvl           >2000 pg/mL  HI                             Total CK                  16 U/L  LOW                             CK MB                      <0.3 ng/mL                             Troponin-I                0.074 ng/mL  HI    2021 0:01 CDT       Total CK                  17 U/L  LOW                             CK MB                     0.4 ng/mL                             Troponin-I                0.070 ng/mL  HI    2021 18:11 CDT      Total CK                  37 U/L                             CK MB                     0.6 ng/mL                             Troponin-I                0.085 ng/mL  HI                             Beta hCG Ql               Negative    2021 13:00 CDT      Hgb                       10.2 gm/dL  LOW                             Hct                       29.4 %  LOW                             Troponin-I                0.078 ng/mL  HI        Radiology results   No recent imaging available for review.      Impression and Plan   36 YO CF, unknown to our group, with a past medical history of etoh abuse, depression, HTN, and placental abruption s/p spontaneous  and DIC. Presented to the ED 21 with complaints of hematemesis, peripheral edema, and HTN. GI consulted for hematemesis.    1. Hematemesis  2. Macrocytic anemia  Hgb 13 (hemoconcentrated) --> 10.2 --> 9.1 g/dL  Folic acid wnl  B12 elevated  No overt GI bleeding  - Monitor H&H and transfuse as necessary  - Monitor for overt GI bleeding  - Continue routine medical management, especially withdrawal protocol  - Continue PPI BID  - Make NPO  - Last to eat/drink was clears at 8:30; okay for EGD 2hrs thereafter. Plan for EGD today. Further recommendations to follow.    3. Etoh abuse  4. Hyperbilirubinemia  5. Transaminitis  INR wnl  - F/u abdominal US/hepatitis panel  - Obtain abnormal LFT work-up  - Etoh cessation      Professional Services   Thank you for this consultation and for allowing us to participate in the care of this patient.

## 2022-05-02 NOTE — HISTORICAL [facility] CERNER
This is a historical note converted from Rafa. Formatting and pictures may have been removed.  Please reference Rafa for original formatting and attached multimedia. Chief Complaint  Hematemesis  History of Present Illness  Judy Ernst?is a 35-year-old?woman with a history of anxiety, HTN, and?alcohol abuse?who presents to the ED?with complaints of one episode?of hematemesis?that occurred?this morning?after she?accidentally?triggered her gag reflex?when brushing her teeth?and describes?the blood as bright red?and filling the sink.? She went to walk-in clinic?initially?due to the episode of vomiting?but they advised her to come to the ER for further evaluation.? She admits to drinking a half a bottle of whiskey daily?and the last time she drank was around?2300 last night.??She denies experiencing similar?symptoms in the past. ?She states?that when she was supposed to?go into inpatient rehab for her alcohol abuse?today?but was unable to?due to other circumstances.??he denies?chest pain,?shortness of breath, abdominal pain,?nausea,?current vomiting,?diarrhea, dysuria, or hematuria. ?She mentioned that she?developed some sort of?rash described as hives?on her bilateral lower extremities?that began on 04/19/2021?that?spread to her breasts,?hips, and?upper extremities?the following day?as well as bilateral ankle edema.? She went to?a walk-in clinic?on 04/20/2021?for the rash, ankle swelling?and cough/sinus congestion?and prescribed?oral steroid,?topical steroid,?benzonatate,?and azithromycin.? She said her cough improved and the rash resolved?that same day?however?her ankle swelling?has not improved. Her initial vital signs?showed temperature 97.8 8, heart rate 127, SPO2 100% on room air, and /129. ?Her heart rate has improved and is currently 109 and her blood pressure has improved after medication to 154/99. CXR showed no acute cardiopulmonary process. Her labs showed?sodium 134, glucose 182,  hyperbilirubinemia, transaminitis, lipase 67, troponin 0.052 at 0921 which was repeated again at 1300 and was 0.078, hemoglobin 10.2, and HCT 29.4. ?Urinalysis showed 2+ leuk esterase and 30 WBCs.? Urine drug screen was positive for cannabis.  Review of Systems  As per HPI otherwise all systems reviewed and negative.  Physical Exam  Vitals & Measurements  T:?37.2? ?C (Oral)? TMIN:?36.6? ?C (Oral)? TMAX:?37.2? ?C (Oral)? HR:?109(Peripheral)? RR:?16? BP:?154/99? SpO2:?100%? WT:?68?kg? BMI:?24.38?  General:?well-developed well-nourished in no acute distress, eating while in room, AAOX4, anxious appearing  Eye: PERRLA, EOMI, clear conjunctiva, eyelids normal  HENT:? oropharynx without erythema/exudate, oropharynx and nasal mucosal surfaces moist  Neck: full range of motion,?no lymphadenopathy  Respiratory:?clear to auscultation bilaterally  Cardiovascular:?regular rate and rhythm without murmurs, gallops or rubs,?2+ pitting edema bilaterally  Gastrointestinal:?soft, non-tender, non-distended with normal bowel sounds, without masses to palpation  Genitourinary: no CVA tenderness to palpation  Musculoskeletal:?full range of motion of all extremities/spine without limitation or discomfort  Integumentary: no rashes or skin lesions present  Neurologic: cranial nerves intact, no signs of peripheral neurological deficit, motor/sensory function intact  ?  Assessment/Plan  Possible acute upper?GI bleed, hematemesis  Transaminitis  Hypertensive urgency  Alcohol abuse  Elevated troponin  Anxiety  Leukocytosis?possibly secondary to acute cystitis?or recent steroid use  Hyperglycemia secondary?to recent steroid use  Cannabis use  Peripheral leg edema  ?  ?  -Current H&H stable?continue to monitor?we will repeat CBC in a.m.?,and if Hgb<7 then we will transfuse  -IV Protonix twice daily  -consider GI consult if H&H worsens or additional episodes of hematemesis  -Ultrasound of abdomen?pending  -Repeat?CMP?in a.m.?and trend  LFTs  -Continue blood pressure control as needed  -Trend cardiac enzymes  -monitor for withdrawals, if she begins having signs of withdrawal can initiate serax protocol  -Ativan IV PRN for seizure activity  -Give daily thiamine?100 mg  -B12 and folate level in AM  -New onset?bilateral?peripheral?edema?consider echo,?although chest x-ray?was unremarkable?and lungs clear during exam  -Urine culture,?blood cultures x2 pending  ?  ?  DVT prophylaxis: SCD  Code Status: Full  PCP: None  ?  I, Triston HERRMANNC, discussed this case with?Dr. Polk  Please see addendum for further assessment and plan per MD.  dr polk- For this encounter, i have reviewed the NP/PA documentation,treatment plan,? medical decision making and had face to face time with the patient, as evidence of documentation in one of the following areas: CC/HPI/PE/MDM.  ?   Chart review and patient examined. ?We had to place patient on Serax?10 mg p.o. 3 times daily?secondary to?agitation.? Appears to have an episode of?acute?alcoholic hepatitis.? Taken in consideration that she had an episode of?upper GI bleed?that could be due to Sheron-Foster/PUD?etc. we will go ahead and consult GI..? Will make sure patient is on PPI IV every 12.? Hepatitis profile will follow?as well as abdominal ultrasound.? I agree with assessment and plans done by PA .?Marc. ?We will continue to follow  ?   For impending DTs patient drinks 1/5?of whiskey?daily. ?May need a psych?evaluation of the same time.? Agree with serial H&H?troponins elevated probably no ST elevation MI type II?leak  ?   Problem List/Past Medical History  Ongoing  Anxiety  Hypertension  Procedure/Surgical History  Extraction of wisdom tooth ()  RIGHT ACL & MENINSCAL REPAIR ()   of stillborn b/c placenta abruptia   X1   Medications  Inpatient  hydrALAZINE (Apresoline) Inj., 10 mg= 0.5 mL, IV Push, q2hr, PRN  Phenergan, 12.5 mg= 0.5 mL, IM, q4hr, PRN  Sodium Chloride 0.9% 1000mL 1,000  mL, 1000 mL, IV, Now  Zofran, 4 mg= 2 mL, IV Push, q4hr, PRN  Home  azithromycin 250 mg oral tablet, 250 mg= 1 tab(s), Oral, As Directed  benzonatate 200 mg oral capsule, 200 mg= 1 cap(s), Oral, TID, PRN  Latuda 20 mg oral tablet, 20 mg= 1 tab(s), Oral, Daily  methylPREDNISolone 4 mg oral tab, Oral  ondansetron 4 mg oral tablet, disintegrating, 4 mg= 1 tab(s), Oral, q8hr  triamcinolone 0.5% topical cream, TOP, BID, PRN  Allergies  lisinopril?(Cough)  Social History  Abuse/Neglect  No, 04/22/2021  Alcohol  Current, Beer, Liquor, Daily, 6 drinks/episode average., 03/06/2019  Never, 02/28/2019  Employment/School  Employed, Work/School description: [employer]., 02/28/2019  Home/Environment  Lives with Spouse., 02/28/2019  Substance Use  Current, Marijuana, 1-2 times per week, 04/22/2021  Tobacco  Never (less than 100 in lifetime), N/A, 04/22/2021  Never (less than 100 in lifetime), N/A, 03/06/2019  Never (less than 100 in lifetime), N/A, 02/28/2019  Family History  Anxiety: Mother.  CABG x 5 - Coronary artery bypass grafts x 5: Father.  Depression.: Mother and Sister.  Detached retina: Mother.  Diabetes mellitus type 2: Mother and Brother.  Hypercholesterolemia: Father and Brother.  Hypertension.: Father and Brother.  Osteoporosis: Mother.  Total hip replacement: Mother.  Immunizations  Vaccine Date Status   tetanus/diphth/pertuss (Tdap) adult/adol 08/2010 Recorded   Lab Results  Labs Last 24 Hours?  ?Chemistry? Hematology/Coagulation?   Sodium Lvl:?134 mmol/L?Low (04/22/21 09:21:00) PT: 13.5 second(s) (04/22/21 09:21:00)   Potassium Lvl: 4 mmol/L (04/22/21 09:21:00) INR: 1.1 (04/22/21 09:21:00)   Chloride:?96 mmol/L?Low (04/22/21 09:21:00) PTT:?22.5 second(s)?Low (04/22/21 09:21:00)   CO2: 22 mmol/L (04/22/21 09:21:00) WBC:?29.4 x10(3)/mcL?High (04/22/21 09:21:00)   Calcium Lvl:?8.3 mg/dL?Low (04/22/21 09:21:00) RBC:?3.6 x10(6)/mcL?Low (04/22/21 09:21:00)   Glucose Lvl:?182 mg/dL?High (04/22/21 09:21:00) Hgb:?10.2  gm/dL?Low (04/22/21 13:00:00)   BUN:?19.8 mg/dL?High (04/22/21 09:21:00) Hct:?29.4 %?Low (04/22/21 13:00:00)   Creatinine: 0.78 mg/dL (04/22/21 09:21:00) Platelet: 296 x10(3)/mcL (04/22/21 09:21:00)   Est Creat Clearance Ser: 93.32 mL/min (04/22/21 10:00:47) MCV:?103.1 fL?High (04/22/21 09:21:00)   eGFR-AA: >60 (04/22/21 09:21:00) MCH:?36.1 pg?High (04/22/21 09:21:00)   eGFR-AMADOU: >60 (04/22/21 09:21:00) MCHC: 35 gm/dL (04/22/21 09:21:00)   Bili Total:?3.7 mg/dL?High (04/22/21 09:21:00) RDW: 12.6 % (04/22/21 09:21:00)   Bili Direct:?2.2 mg/dL?High (04/22/21 09:21:00) MPV: 11.8 fL (04/22/21 09:21:00)   Bili Indirect:?1.5 mg/dL?High (04/22/21 09:21:00) Abs Neut:?24.19 x10(3)/mcL?High (04/22/21 09:21:00)   AST:?290 unit/L?High (04/22/21 09:21:00) Neut Man:?90 %?High (04/22/21 09:21:00)   ALT:?250 unit/L?High (04/22/21 09:21:00) Lymph Man:?6 %?Low (04/22/21 09:21:00)   Alk Phos:?363 unit/L?High (04/22/21 09:21:00) Monocyte Man:?1 %?Low (04/22/21 09:21:00)   Total Protein: 7.4 gm/dL (04/22/21 09:21:00) Osgood Man:?1 %?High (04/22/21 09:21:00)   Albumin Lvl:?2.9 gm/dL?Low (04/22/21 09:21:00) Myelo Man:?2 %?High (04/22/21 09:21:00)   Globulin:?4.5 gm/dL?High (04/22/21 09:21:00) Platelet Est: Normal (04/22/21 09:21:00)   A/G Ratio:?0.6 ratio?Low (04/22/21 09:21:00) Anisocyte:?2?High (04/22/21 09:21:00)   BNP: 50.2 pg/mL (04/22/21 09:21:00) Poik:?1?High (04/22/21 09:21:00)   Lactic Acid Lvl: 1.2 mmol/L (04/22/21 10:32:00) Macrocyte:?2 /mcL?High (04/22/21 09:21:00)   Lipase Lvl:?67 U/L?High (04/22/21 09:21:00) Stomatocytes: 1+ (04/22/21 09:21:00)   Troponin-I:?0.078 ng/mL?High (04/22/21 13:00:00)    U pH: 6.5 (04/22/21 10:45:00)    U Spec Grav: 1.011 (04/22/21 10:45:00)    Diagnostic Results  ?  Reason For Exam  Cough  ?  Radiology Report  EXAM: XR Chest 1 View  DATE: 4/22/2021 10:03 AM CDT  INDICATION: Cough  ?  COMPARISON: None available.  ?  TECHNIQUE: AP view of the chest.  ?  ?  FINDINGS:?  The cardiomediastinal silhouette  is normal in size and contour.  Pulmonary vascularity is within normal limits. The lungs are  well-inflated and are without focal consolidation, pleural effusion,  or pneumothorax.  ?  The imaged osseous structures and soft tissues are without acute  abnormality.  ?  ?  IMPRESSION:  No acute cardiopulmonary process.  ?  Signature Line  Electronically Signed By: Augustin Young MD  Date/Time Signed: 04/22/2021 10:33  ?

## 2022-05-02 NOTE — HISTORICAL [facility] CERNER
This is a historical note converted from Rafa. Formatting and pictures may have been removed.  Please reference Cerrhiannon for original formatting and attached multimedia. Admit and Discharge Dates  Admit Date: 04/22/2021  Discharge Date: 04/25/2021  Physicians  Attending Physician - Jam JAIN, Angelo NOWAK  Admitting Physician - Jam JAIN, Angelo NOWAK  Primary Care Physician - Rupesh JAIN, Mahin ARREDONDO  Discharge Diagnosis  Alcohol withdrawal?F10.239  GI bleeding?K92.2  Hypertension?[id number]  Moderate protein-calorie malnutrition?E44.0  acute UTI  sinus tachycardia  Surgical Procedures  No procedures recorded for this visit.  Immunizations  No immunizations recorded for this visit.  Admission Information  Judy Ernst?is a 35-year-old?woman with a history of anxiety, HTN, and?alcohol abuse?who presents to the ED?with complaints of one episode?of hematemesis?that occurred?this morning?after she?accidentally?triggered her gag reflex?when brushing her teeth?and describes?the blood as bright red?and filling the sink.? She went to walk-in clinic?initially?due to the episode of vomiting?but they advised her to come to the ER for further evaluation.? She admits to drinking a half a bottle of whiskey daily?and the last time she drank was around?2300 last night.??She denies experiencing similar?symptoms in the past. ?She states?that when she was supposed to?go into inpatient rehab for her alcohol abuse?today?but was unable to?due to other circumstances.??he denies?chest pain,?shortness of breath, abdominal pain,?nausea,?current vomiting,?diarrhea, dysuria, or hematuria. ?She mentioned that she?developed some sort of?rash described as hives?on her bilateral lower extremities?that began on 04/19/2021?that?spread to her breasts,?hips, and?upper extremities?the following day?as well as bilateral ankle edema.? She went to?a walk-in clinic?on 04/20/2021?for the rash, ankle swelling?and cough/sinus congestion?and  prescribed?oral steroid,?topical steroid,?benzonatate,?and azithromycin.? She said her cough improved and the rash resolved?that same day?however?her ankle swelling?has not improved. Her initial vital signs?showed temperature 97.8 8, heart rate 127, SPO2 100% on room air, and /129. ?Her heart rate has improved and is currently 109 and her blood pressure has improved after medication to 154/99. CXR showed no acute cardiopulmonary process. Her labs showed?sodium 134, glucose 182, hyperbilirubinemia, transaminitis, lipase 67, troponin 0.052 at 0921 which was repeated again at 1300 and was 0.078, hemoglobin 10.2, and HCT 29.4. ?Urinalysis showed 2+ leuk esterase and 30 WBCs.? Urine drug screen was positive for cannabis..  ?   EGD on 4/23/[id number] ulcer in the distal body of the greater curvature with dark eschar, but without a visible vascular edges and black.? Adjacent biopsies obtained to rule out H. pylori  Recommended PPI twice daily x1 month, followed by PPI daily and Carafate 4 times daily x7 days  Ultrasound abdomen-hepatic steatosis?and hepatosplenomegaly  Counseled on complete abstinence from alcohol. was started on serax protocol and prn ativan for alcohol withdrawl. pt remained tachycardic throughout the hospital stay, EKG- sinus tachy. started on metoprolol 50 mg bid. HR controlled  mildly elevated troponins on admission, stable, no chest pain, likely demand ischemia  Urine cultures-E. coli, was treated w 3 days of rocephin in the hospital and discharged on augmentin?  discharged in stable condition. prn ativan? for symptoms of alcohol withdrawl?  scheduled follow up w GI in 8weeks for repeat EGD per GI  close follow up with PCP  Time Spent on discharge  35 min  Objective  Vitals & Measurements  T:?36.7? ?C (Oral)? TMIN:?36.7? ?C (Oral)? TMAX:?37.3? ?C (Oral)? HR:?95(Peripheral)? RR:?18? BP:?133/90? SpO2:?97%?  Physical Exam  General: AAOx3, NAD, alert and cooperative  HEENT: PERRLA, EOMI,  normal conjunctiva  Neck: no LAD, no JVD, supple, no masses or thyromegaly  CVS: S1/S2 nml, RRR, no murmurs, rubs or gallops  Resp: CTA B/L, no rhonchi, rales, or wheezing  GI: Not distended, not tender, BS+, no guarding  Skin: not jaundiced, warm, no rashes  Extremities: no peripheral edema, peripheral pulses intact  Neuro: CN II-XII grossly intact, strength and sensation symmetric and intact throughout, no focal neurological deficits.  Patient Discharge Condition  improved and stable  Discharge Disposition  home   Discharge Medication Reconciliation  Prescribed  LORAzepam (LORazepam 0.5 mg oral tablet)?0.25 mg, Oral, BID, PRN symptoms of alcohol withdrawal  folic acid (folic acid 1 mg oral tablet)?1 mg, Oral, Daily  metoprolol (metoprolol tartrate 50 mg oral tablet)?50 mg, Oral, BID  oxazepam (oxazepam 10 mg oral capsule)?10 mg, Oral, BID  sulfamethoxazole-trimethoprim (Bactrim  mg-160 mg oral tablet)?1 tab(s), Oral, BID  thiamine (thiamine 100 mg oral tablet)?100 mg, Oral, Daily  Continue  benzonatate (benzonatate 200 mg oral capsule)?200 mg, Oral, TID, PRN cough  lurasidone (Latuda 20 mg oral tablet)?20 mg, Oral, Daily  ondansetron (ondansetron 4 mg oral tablet, disintegrating)?4 mg, Oral, q8hr  triamcinolone topical (triamcinolone 0.5% topical cream), TOP, BID, PRN rash  Discontinue  azithromycin (azithromycin 250 mg oral tablet)?250 mg, Oral, As Directed  methylPREDNISolone (methylPREDNISolone 4 mg oral tab), Oral  Education and Orders Provided  Discharge - 04/25/21 8:28:00 CDT, Home?  Follow up  Yeni Thompson  ????New patient appointment  [employer]  No Qualifying Data

## 2022-05-02 NOTE — HISTORICAL [facility] C[facility]ER
This is a historical note converted from Rafa. Formatting and pictures may have been removed.  Please reference Rafa for original formatting and attached multimedia. Chief Complaint  CPX/Fasting/[facility] follow up  History of Present Illness  Reviewed hospital records---?patient is here today for follow-up hospitalization?at [facility]?due to upper GI bleed?and alcoholic hepatitis.? Patient admits to?drinking?1/5 of whiskey?daily?prior to admission.? She was also taking frequent NSAIDs?due to minor injuries?that would occur when she was?drunk.? She presented to the emergency room?after an episode of hematemesis?and was admitted?for further work-up and evaluation. ?Her blood pressure was also elevated?during hospitalization and she was started on metoprolol 50 mg twice daily.? She reports that her blood pressure has been stable?since starting this medication. ?She does not drink alcohol?in 11 days.? She is planning on seeing a counselor?to assist her with?alcohol cessation.? She has been taking?pantoprazole 40 mg twice daily?and has a follow-up visit with her gastroenterologist?in 2 months.? She does still complain of?mild midepigastric?discomfort?but this seems to be improving.? Has been taking Carafate 4 times a day?but is currently out of that prescription. ?She would like a refill.? She would also like todays?appointment to be?completed as wellness CPX.? She has not had a cycle this month?and has done a couple of?pregnancy test that returned negative.  Review of Systems  GENERAL:??no?? weight loss,?+? decreased appetite--improved ,??no?? fever  CARDIAC:??no?? chest pain,??no?? palpitations, HTN and elevate dHR in hospital  RESP:???no?? SOB,??no?? wheezing  GI:???no?? nausea,?? ?no?? vomiting,?? ?no?? hematemesis,?? ?no?? diarrhea,?? ?no?? melena,??? ?no?? constipation  ?+ midepigastric?mild ?? abdominal pain,?? pain??unchanged?? with eating  :? ?no?? blood in urine,?? ?no?? dysuria,  frequency or urgency,????no? hx of kidney stones??  Physical Exam  Vitals & Measurements  HR:?78(Peripheral)? BP:?134/80?  HT:?167.00?cm? WT:?70.000?kg? BMI:?25.1?  GENERAL:? NAD  HEENT:? mucous membranes??moist  CHEST:? CTA bilaterally  CARDIAC:??regular??rate and regular?rhythm, ?no murmurs audible  ABDOMEN:??soft,??notenderness,?normalbowel soundsx4, ?norebound, ?no?guarding  EXT: no edema  Assessment/Plan  1.?Wellness examination?Z00.00  CBC, CMP, FLP,?TSH,?UA,?upt, gyn 6/2020 neg pap, Tdap today, continue etoh cessation, recommend AA, Encourage pt to increase cardiovascular exercise and attempt to obtain at least 150 minutes of moderate aerobic exercise per week or 75 minutes of vigorous aerobic exercise weekly.? Recommend at least 2 days of weight bearing exercise to help increase bone density.  Ordered:  Comprehensive Metabolic Panel, Routine collect, 05/03/21 11:18:00 CDT, Blood, Stop date 05/03/21 11:18:00 CDT, Lab Collect, Wellness examination  Hypertension, 05/03/21 11:18:00 CDT  Lipid Panel, Routine collect, 05/03/21 11:18:00 CDT, Blood, Stop date 05/03/21 11:18:00 CDT, Lab Collect, Wellness examination  Hypertension, 05/03/21 11:18:00 CDT  Preventative Health Care Est 18-39 years [id number] PC, Wellness examination  Upper GI bleed  Anemia  Alcoholic hepatitis  Hypertension, HLINK AMB - AFP, 05/03/21 11:06:00 CDT  Thyroid Stimulating Hormone, Routine collect, 05/03/21 11:18:00 CDT, Blood, Stop date 05/03/21 11:18:00 CDT, Lab Collect, Wellness examination  Upper GI bleed  Anemia  Alcoholic hepatitis, 05/03/21 11:18:00 CDT  ?  2.?Upper GI bleed?K92.2  ?+ peptic ulcer seen on EGD-neg bx for h. pylori----, Avoid NSAIDS , ?continue PPI bid x 1 month then q d, and refill carafate, repeat CBC and iron level ?today  Ordered:  Clinic Follow up, *Est. 08/04/21 10:00:00 CDT, Order for future visit, Hypertension, HLink AFP  Preventative Health Care Est 18-39 years [id number] PC, Wellness examination  Upper GI bleed   Anemia  Alcoholic hepatitis  Hypertension, HLINK AMB - AFP, 05/03/21 11:06:00 CDT  Thyroid Stimulating Hormone, Routine collect, 05/03/21 11:18:00 CDT, Blood, Stop date 05/03/21 11:18:00 CDT, Lab Collect, Wellness examination  Upper GI bleed  Anemia  Alcoholic hepatitis, 05/03/21 11:18:00 CDT  ?  3.?Anemia?D64.9  iron def,?begin iron supplementation  Ordered:  Clinic Follow up, *Est. 08/04/21 10:00:00 CDT, Order for future visit, Hypertension, HLink AFP  Preventative Health Care Est 18-39 years [id number] PC, Wellness examination  Upper GI bleed  Anemia  Alcoholic hepatitis  Hypertension, HLINK AMB - AFP, 05/03/21 11:06:00 CDT  Thyroid Stimulating Hormone, Routine collect, 05/03/21 11:18:00 CDT, Blood, Stop date 05/03/21 11:18:00 CDT, Lab Collect, Wellness examination  Upper GI bleed  Anemia  Alcoholic hepatitis, 05/03/21 11:18:00 CDT  ?  4.?Alcoholic hepatitis?K70.10  CMP ?today, avoid ETOH completely-=-Day 11 without ETOH, Hepatitis panel negative  Ordered:  Clinic Follow up, *Est. 08/04/21 10:00:00 CDT, Order for future visit, Hypertension, HLink AFP  Preventative Health Care Est 18-39 years [id number] PC, Wellness examination  Upper GI bleed  Anemia  Alcoholic hepatitis  Hypertension, HLINK AMB - AFP, 05/03/21 11:06:00 CDT  Thyroid Stimulating Hormone, Routine collect, 05/03/21 11:18:00 CDT, Blood, Stop date 05/03/21 11:18:00 CDT, Lab Collect, Wellness examination  Upper GI bleed  Anemia  Alcoholic hepatitis, 05/03/21 11:18:00 CDT  ?  5.?Hypertension?I10  ?stable--continue metoprolol 50 mg bid  Ordered:  Clinic Follow up, *Est. 08/04/21 10:00:00 CDT, Order for future visit, Hypertension, HLink AFP  Comprehensive Metabolic Panel, Routine collect, 05/03/21 11:18:00 CDT, Blood, Stop date 05/03/21 11:18:00 CDT, Lab Collect, Wellness examination  Hypertension, 05/03/21 11:18:00 CDT  Lipid Panel, Routine collect, 05/03/21 11:18:00 CDT, Blood, Stop date 05/03/21 11:18:00 CDT, Lab Collect, Wellness  examination  Hypertension, 21 11:18:00 CDT  Preventative Health Care Est 18-39 years [id number] PC, Wellness examination  Upper GI bleed  Anemia  Alcoholic hepatitis  Hypertension, HLINK AMB - AFP, 21 11:06:00 CDT  ?  Orders:  metoprolol, 50 mg = 1 tab(s), Oral, BID, # 60 tab(s), 5 Refill(s), Pharmacy: [id number], 167, cm, Height/Length Dosing, 21 10:33:00 CDT, 70, kg, Weight Dosing, 21 10:33:00 CDT  sucralfate, 1 gm = 1 tab(s), Oral, QID, # 56 tab(s), 0 Refill(s), Pharmacy: [facility] [id number], 167, cm, Height/Length Dosing, 21 10:33:00 CDT, 70, kg, Weight Dosing, 21 10:33:00 CDT  4x---elevated alpha 1-antitrypsin--acute phase reactant--likely due to liver inflammation??--f/u with GI  F/U 3 months  Referrals  Clinic Follow up, *Est. 21 10:00:00 CDT, Order for future visit, Hypertension, HLink AFP   Problem List/Past Medical History  Ongoing  Anxiety  Hypertension  Immunization due  Wellness examination  Historical  Anemia  DIC - Disseminated intravascular coagulation screen  Pre-eclampsia  Stomach ulcer  Procedure/Surgical History  Esophagogastroduodenoscopy (2021)  Excision of Stomach, Pylorus, Via Natural or Artificial Opening Endoscopic, Diagnostic (2021)  Extraction of wisdom tooth ()  RIGHT ACL & MENINSCAL REPAIR ()   of stillborn b/c placenta abruptia   X1   Medications  benzonatate 200 mg oral capsule, 200 mg= 1 cap(s), Oral, TID  Carafate 1 g oral tablet, 1 gm= 1 tab(s), Oral, QID  folic acid 1 mg oral tablet, 1 mg= 1 tab(s), Oral, Daily  Latuda 20 mg oral tablet, 20 mg= 1 tab(s), Oral, Daily  metoprolol tartrate 50 mg oral tablet, 50 mg= 1 tab(s), Oral, BID, 5 refills  ondansetron 4 mg oral tablet, disintegrating, 4 mg= 1 tab(s), Oral, q8hr  Pantoprazole 40 mg ORAL EC-Tablet, 40 mg= 1 tab(s), Oral, BID  thiamine 100 mg oral tablet, 100 mg= 1 tab(s), Oral, Daily  triamcinolone 0.5% topical cream, TOP, BID,  PRN  Allergies  lisinopril?(Cough)  Social History  Abuse/Neglect  No, 05/03/2021  No, 04/22/2021  Alcohol  Past, Liquor, Daily, 05/03/2021  Current, Beer, Liquor, Daily, 6 drinks/episode average., 03/06/2019  Never, 02/28/2019  Employment/School  Employed, Work/School description: [employer] 02/28/2019  Home/Environment  Lives with Spouse., 02/28/2019  Substance Use  Current, Marijuana, 1-2 times per week, 04/22/2021  Tobacco  Never (less than 100 in lifetime), N/A, 05/03/2021  Never (less than 100 in lifetime), N/A, 04/22/2021  Never (less than 100 in lifetime), N/A, 03/06/2019  Never (less than 100 in lifetime), N/A, 02/28/2019  Family History  Anxiety: Mother.  CABG x 5 - Coronary artery bypass grafts x 5: Father.  Depression.: Mother and Sister.  Detached retina: Mother.  Diabetes mellitus type 2: Mother and Brother.  Hypercholesterolemia: Father and Brother.  Hypertension.: Father and Brother.  Osteoporosis: Mother.  Renal insufficiency: Mother.  Total hip replacement: Mother.  Immunizations  Vaccine Date Status   tetanus/diphtheria/pertussis, acel(Tdap) 05/03/2021 Given   tetanus/diphth/pertuss (Tdap) adult/adol 08/2010 Recorded   Health Maintenance  Health Maintenance  ???Pending?(in the next year)  ??? ??OverDue  ??? ? ? ?Influenza Vaccine due??10/01/20??and every 1??day(s)  ??? ? ? ?Alcohol Misuse Screening due??01/02/21??and every 1??year(s)  ??? ??Due?  ??? ? ? ?ADL Screening due??05/03/21??and every 1??year(s)  ??? ? ? ?Hypertension Management-Education due??05/03/21??and every 1??year(s)  ??? ??Due In Future?  ??? ? ? ?Obesity Screening not due until??01/01/22??and every 1??year(s)  ??? ? ? ?Hypertension Management-BMP not due until??04/24/22??and every 1??year(s)  ???Satisfied?(in the past 1 year)  ??? ??Satisfied?  ??? ? ? ?Blood Pressure Screening on??05/03/21.??Satisfied by Teodora Randolph CMA  ??? ? ? ?Body Mass Index Check on??05/03/21.??Satisfied by Teodora Randolph CMA  ??? ? ?  ?Cervical Cancer Screening on??06/12/20.??Satisfied by Sary Tian  ??? ? ? ?Diabetes Screening on??04/24/21.??Satisfied by Ese Beltran  ??? ? ? ?Hypertension Management-Blood Pressure on??05/03/21.??Satisfied by Teodora Randolph CMA  ??? ? ? ?Obesity Screening on??05/03/21.??Satisfied by Teodora Randolph CMA  ??? ? ? ?Tetanus Vaccine on??05/03/21.??Satisfied by Teodora Randolph CMA  ?